# Patient Record
Sex: MALE | Race: BLACK OR AFRICAN AMERICAN | Employment: FULL TIME | URBAN - METROPOLITAN AREA
[De-identification: names, ages, dates, MRNs, and addresses within clinical notes are randomized per-mention and may not be internally consistent; named-entity substitution may affect disease eponyms.]

---

## 2021-09-14 ENCOUNTER — LAB ENCOUNTER (OUTPATIENT)
Dept: LAB | Age: 46
End: 2021-09-14
Attending: PREVENTIVE MEDICINE
Payer: COMMERCIAL

## 2021-09-14 ENCOUNTER — TELEPHONE (OUTPATIENT)
Dept: INTERNAL MEDICINE CLINIC | Facility: HOSPITAL | Age: 46
End: 2021-09-14

## 2021-09-14 DIAGNOSIS — Z20.822 SUSPECTED COVID-19 VIRUS INFECTION: Primary | ICD-10-CM

## 2021-09-14 DIAGNOSIS — Z20.822 SUSPECTED COVID-19 VIRUS INFECTION: ICD-10-CM

## 2021-09-14 LAB — SARS-COV-2 RNA RESP QL NAA+PROBE: NOT DETECTED

## 2021-09-14 NOTE — TELEPHONE ENCOUNTER
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 9/15/2021    Did you have close contact with someone on your unit while not wearing a mask? (e.g., during meal breaks):  Yes []   No [x]    If yes, who:   Do you share a workspace? Yes [x]   No []       If yes, with whom?   C exposure.                                                  COVID-19 testing ordered: [x] Rapid    [] Alinity    Date test is to be taken:    9/14/2021    []  Outside testing       [x] Manager notified    INSTRUCTIONS PROVIDED:    [x]Employee was instructed

## 2021-09-14 NOTE — TELEPHONE ENCOUNTER
9/14/2021:  Alinity test ordered for 9/16/2021. Rapid negative    Results and RTW guidelines:    COVID RESULT:    [] Viewed by employee in [address] RTW plan and instructions as indicated on triage call. Manager notified.   Estimated RTW date:   [x] Discu sx  [] RTW when sx improve; must be fever free for 24 hours w/o medications, Diarrhea/Vomiting free for 24 hours w/o medications  [x] Alinity ordered; continue to monitor sx and call for new/worsening sx.   Discuss RTW guidelines with manager  [] Marylu acharya

## 2021-09-16 ENCOUNTER — LAB ENCOUNTER (OUTPATIENT)
Dept: LAB | Facility: HOSPITAL | Age: 46
End: 2021-09-16
Attending: PREVENTIVE MEDICINE

## 2021-09-16 DIAGNOSIS — Z20.822 SUSPECTED COVID-19 VIRUS INFECTION: ICD-10-CM

## 2021-09-17 LAB — SARS-COV-2 RNA RESP QL NAA+PROBE: NOT DETECTED

## 2021-09-17 NOTE — TELEPHONE ENCOUNTER
Results and RTW guidelines:    COVID RESULT:    [x] Viewed by employee in [address] RTW plan and instructions as indicated on triage call. Manager notified.   Estimated RTW date:  May continue to work  [] Discussed with employee   [] Unable to reach by p monitor sx and call for new/worsening sx.   Discuss RTW guidelines with manager  [x] May continue to work  [] If test result is negative, return to work after 7 days from exposure date  [] Follow up with PCP  [] Home until further instruction from hotline w

## 2021-09-22 ENCOUNTER — IMMUNIZATION (OUTPATIENT)
Dept: LAB | Facility: HOSPITAL | Age: 46
End: 2021-09-22
Attending: EMERGENCY MEDICINE
Payer: COMMERCIAL

## 2021-09-22 DIAGNOSIS — Z23 NEED FOR VACCINATION: Primary | ICD-10-CM

## 2021-09-22 PROCEDURE — 0001A SARSCOV2 VAC 30MCG/0.3ML IM: CPT

## 2021-10-13 ENCOUNTER — IMMUNIZATION (OUTPATIENT)
Dept: LAB | Facility: HOSPITAL | Age: 46
End: 2021-10-13
Attending: EMERGENCY MEDICINE
Payer: COMMERCIAL

## 2021-10-13 DIAGNOSIS — Z23 NEED FOR VACCINATION: Primary | ICD-10-CM

## 2021-10-13 PROCEDURE — 0002A SARSCOV2 VAC 30MCG/0.3ML IM: CPT

## 2022-06-21 ENCOUNTER — OFFICE VISIT (OUTPATIENT)
Dept: FAMILY MEDICINE CLINIC | Facility: CLINIC | Age: 47
End: 2022-06-21
Payer: COMMERCIAL

## 2022-06-21 ENCOUNTER — TELEPHONE (OUTPATIENT)
Dept: FAMILY MEDICINE CLINIC | Facility: CLINIC | Age: 47
End: 2022-06-21

## 2022-06-21 VITALS
HEART RATE: 70 BPM | TEMPERATURE: 97 F | HEIGHT: 69 IN | WEIGHT: 145 LBS | DIASTOLIC BLOOD PRESSURE: 84 MMHG | BODY MASS INDEX: 21.48 KG/M2 | RESPIRATION RATE: 18 BRPM | SYSTOLIC BLOOD PRESSURE: 128 MMHG

## 2022-06-21 DIAGNOSIS — M54.50 DORSALGIA OF LUMBAR REGION: Primary | ICD-10-CM

## 2022-06-21 PROCEDURE — 3008F BODY MASS INDEX DOCD: CPT | Performed by: FAMILY MEDICINE

## 2022-06-21 PROCEDURE — 3079F DIAST BP 80-89 MM HG: CPT | Performed by: FAMILY MEDICINE

## 2022-06-21 PROCEDURE — 99213 OFFICE O/P EST LOW 20 MIN: CPT | Performed by: FAMILY MEDICINE

## 2022-06-21 PROCEDURE — 3074F SYST BP LT 130 MM HG: CPT | Performed by: FAMILY MEDICINE

## 2022-06-21 RX ORDER — CYCLOBENZAPRINE HCL 10 MG
10 TABLET ORAL NIGHTLY
Qty: 20 TABLET | Refills: 0 | Status: SHIPPED | OUTPATIENT
Start: 2022-06-21

## 2022-08-31 PROCEDURE — 99284 EMERGENCY DEPT VISIT MOD MDM: CPT

## 2022-08-31 PROCEDURE — 96372 THER/PROPH/DIAG INJ SC/IM: CPT

## 2022-09-01 ENCOUNTER — HOSPITAL ENCOUNTER (EMERGENCY)
Facility: HOSPITAL | Age: 47
Discharge: HOME | End: 2022-09-01
Attending: EMERGENCY MEDICINE
Payer: COMMERCIAL

## 2022-09-01 VITALS
HEART RATE: 84 BPM | RESPIRATION RATE: 18 BRPM | BODY MASS INDEX: 22 KG/M2 | WEIGHT: 150 LBS | SYSTOLIC BLOOD PRESSURE: 130 MMHG | TEMPERATURE: 98 F | OXYGEN SATURATION: 99 % | DIASTOLIC BLOOD PRESSURE: 70 MMHG

## 2022-09-01 DIAGNOSIS — M62.838 SPASM OF MUSCLE: ICD-10-CM

## 2022-09-01 DIAGNOSIS — S16.1XXA STRAIN OF NECK MUSCLE, INITIAL ENCOUNTER: Primary | ICD-10-CM

## 2022-09-01 RX ORDER — ORPHENADRINE CITRATE 30 MG/ML
60 INJECTION INTRAMUSCULAR; INTRAVENOUS ONCE
Status: COMPLETED | OUTPATIENT
Start: 2022-09-01 | End: 2022-09-01

## 2022-09-01 RX ORDER — HYDROCODONE BITARTRATE AND ACETAMINOPHEN 5; 325 MG/1; MG/1
1 TABLET ORAL EVERY 6 HOURS PRN
Qty: 10 TABLET | Refills: 0 | Status: SHIPPED | OUTPATIENT
Start: 2022-09-01

## 2022-09-01 RX ORDER — KETOROLAC TROMETHAMINE 30 MG/ML
30 INJECTION, SOLUTION INTRAMUSCULAR; INTRAVENOUS ONCE
Status: COMPLETED | OUTPATIENT
Start: 2022-09-01 | End: 2022-09-01

## 2022-09-01 RX ORDER — ORPHENADRINE CITRATE 100 MG/1
100 TABLET, EXTENDED RELEASE ORAL 2 TIMES DAILY
Qty: 10 TABLET | Refills: 0 | Status: SHIPPED | OUTPATIENT
Start: 2022-09-01 | End: 2022-09-06

## 2022-09-01 RX ORDER — LIDOCAINE 50 MG/G
1 PATCH TOPICAL EVERY 24 HOURS
Qty: 6 PATCH | Refills: 0 | Status: SHIPPED | OUTPATIENT
Start: 2022-09-01 | End: 2022-09-07

## 2022-09-01 RX ORDER — METHYLPREDNISOLONE 4 MG/1
TABLET ORAL
Qty: 1 EACH | Refills: 0 | Status: SHIPPED | OUTPATIENT
Start: 2022-09-01

## 2022-09-01 RX ORDER — HYDROCODONE BITARTRATE AND ACETAMINOPHEN 5; 325 MG/1; MG/1
1 TABLET ORAL ONCE
Status: COMPLETED | OUTPATIENT
Start: 2022-09-01 | End: 2022-09-01

## 2022-09-01 NOTE — ED INITIAL ASSESSMENT (HPI)
PT TO ER C/O NECK PAIN THAT STARTED TODAY WHEN HE WOKE UP. PT DENIES ANY INJURY, STATES THE PAIN HAS GOTTEN WORSE THROUGHOUT THE DAY. PT STATES HE TOOK MARILEE'S BACK PILL AND A 10 MG FLEXERIL WITH NO RELIEF.

## 2022-11-02 ENCOUNTER — IMMUNIZATION (OUTPATIENT)
Dept: LAB | Facility: HOSPITAL | Age: 47
End: 2022-11-02
Attending: PREVENTIVE MEDICINE
Payer: COMMERCIAL

## 2022-11-02 DIAGNOSIS — Z23 NEED FOR VACCINATION: Primary | ICD-10-CM

## 2022-11-02 PROCEDURE — 90471 IMMUNIZATION ADMIN: CPT

## 2025-03-23 ENCOUNTER — HOSPITAL ENCOUNTER (OUTPATIENT)
Age: 50
Discharge: ANOTHER HOSPITAL | End: 2025-03-23
Payer: COMMERCIAL

## 2025-03-23 VITALS
SYSTOLIC BLOOD PRESSURE: 144 MMHG | TEMPERATURE: 99 F | RESPIRATION RATE: 20 BRPM | OXYGEN SATURATION: 100 % | DIASTOLIC BLOOD PRESSURE: 100 MMHG | HEART RATE: 93 BPM

## 2025-03-23 DIAGNOSIS — R03.0 ELEVATED BLOOD PRESSURE READING: ICD-10-CM

## 2025-03-23 DIAGNOSIS — M79.89 SWELLING OF RIGHT LITTLE FINGER: ICD-10-CM

## 2025-03-23 DIAGNOSIS — M65.949 FLEXOR TENOSYNOVITIS OF FINGER: Primary | ICD-10-CM

## 2025-03-23 NOTE — ED QUICK NOTES
Patient was discharged to [facility] ER for further evaluation of his right hand pain and swelling.  Patient did say on the way out \"I think I'm just going to go home and relax.  I'll go tomorrow.\"  Patient was encourage to go today at this time.  The provider did inform the patient that there was no waiting in the ER right now.

## 2025-03-23 NOTE — ED INITIAL ASSESSMENT (HPI)
PATIENT IS HERE WITH SWELLING TO HIS RIGHT HAND AFTER NO INJURY.  THE PAIN AND SWELLING IS BELOW THE FIFTH FINGER.  HE STATES HE WOKE UP YESTERDAY WITH IT THAT WAY.

## 2025-03-23 NOTE — ED PROVIDER NOTES
Patient Seen in: [facility]      History   No chief complaint on file.    Stated Complaint: Skin Problem    Subjective:   HPI    49-year-old male here for evaluation of right hand swelling, right fifth finger pain and swelling that started Saturday morning when he woke up. He has decreased range of motion nuris to 5th finger so came in for eval. He denies any history of falls trauma or injury.  He denies any fever chills or sweats.  He denies any previous injury to this finger in the past. 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 Denies diabetes or medical history.    Objective:     History reviewed. No pertinent past medical history.           History reviewed. No pertinent surgical history.             Social History     Socioeconomic History    Marital status: Single    Number of children: 1   Occupational History    Occupation: Histology tech     Comment: [employer]   Tobacco Use    Smoking status: Every Day     Types: Cigarettes    Smokeless tobacco: Never   Substance and Sexual Activity    Alcohol use: Yes    Drug use: No              Review of Systems    Positive for stated complaint: Skin Problem  Other systems are as noted in HPI.  Constitutional and vital signs reviewed.      All other systems reviewed and negative except as noted above.    Physical Exam     ED Triage Vitals   BP 03/23/25 1430 (!) 144/100   Pulse 03/23/25 1429 93   Resp 03/23/25 1429 20   Temp 03/23/25 1429 98.5 °F (36.9 °C)   Temp src 03/23/25 1429 Oral   SpO2 03/23/25 1429 100 %   O2 Device 03/23/25 1429 None (Room air)       Current Vitals:   Vital Signs  BP: (!) 144/100  Pulse: 93  Resp: 20  Temp: 98.5 °F (36.9 °C)  Temp src: Oral    Oxygen Therapy  SpO2: 100 %  O2 Device: None (Room air)        Physical Exam  Vitals and nursing note reviewed.   Constitutional:       General: He is in acute distress.      Appearance: Normal appearance. He is not ill-appearing, toxic-appearing or diaphoretic.    Cardiovascular:      Rate and Rhythm: Normal rate.      Pulses: Normal pulses.   Musculoskeletal:      Right hand: Swelling (Right fifth finger) and tenderness (Along MCP and flexor tendon) present. No deformity, lacerations or bony tenderness. Decreased range of motion (Unable to fully flex and extend fifth finger joint). Normal strength. Normal sensation. There is no disruption of two-point discrimination. Normal capillary refill. Normal pulse.      Cervical back: Normal range of motion and neck supple. No tenderness.   Skin:     General: Skin is warm and dry.      Findings: No erythema.   Neurological:      Mental Status: He is alert and oriented to person, place, and time.      Motor: No weakness.   Psychiatric:         Mood and Affect: Mood normal.         Behavior: Behavior normal.           ED Course   Labs Reviewed - No data to display              MDM     49 yr old male here for right hand swelling, 5th finger pain and swelling, decreased range of motion x 2 days. Woke up Saturday am with it. Denies fall or trauma.    ON exam, pt well appearing otherwise. Right radial pulse normal. Skin warm to touch, tender nuris to 5th MCP especially but diffuse finger. Cap refill normal. PT unable to fully extend finger, and cannot make fist and fully flex finger.     Differential diagnoses reflecting the complexity of care include but are not limited to trigger finger, infectious flexor tenosynovitis.    Comorbidities that add complexity to management include: none  History obtained by an independent source was from: patient  My independent interpretations of studies include: none  Shared decision making was done by: patient, myself  Discussions of management was done with: Dr Arreola attending at [facility].    Patient is well appearing, non-toxic and in distress due to hand pain.  BP elevated 144/100    Discussed that pt has likely infectious flexor tenosynovitis.  Discussed patient with DR Arreola, due to erythema,  warmth, elevated BP and decreased ROM, will need labs we do not have here and IV abx likely.  Called DR Garrett for discussion, pending call back.     Given the limitations of the immediate care and the likely need for advanced lab testing and/or imaging not available here the patient will be sent to the emergency department for further evaluation and management.    PT agrees, Shandra preferred ER.        Medical Decision Making      Disposition and Plan     Clinical Impression:  1. Flexor tenosynovitis of finger    2. Swelling of right little finger    3. Elevated blood pressure reading         Disposition:  Ic to ed  3/23/2025  3:43 pm    Follow-up:  No follow-up provider specified.        Medications Prescribed:  Discharge Medication List as of 3/23/2025  3:52 PM              Supplementary Documentation:

## (undated) NOTE — LETTER
6/21/2022          To Whom It May Concern:    Rosmery Minaya is currently under my medical care. Please excuse the patient from work missed on 6/22/22 as the patient has been ill with a medical issue. May return to work on 6/23/22 without restrictions. If you require additional information please contact our office.         Sincerely,    Juanita Cao MD          Document generated by:  Juanita Cao MD